# Patient Record
Sex: FEMALE | Race: WHITE | NOT HISPANIC OR LATINO | Employment: PART TIME | URBAN - METROPOLITAN AREA
[De-identification: names, ages, dates, MRNs, and addresses within clinical notes are randomized per-mention and may not be internally consistent; named-entity substitution may affect disease eponyms.]

---

## 2018-06-25 ENCOUNTER — RECORDS - HEALTHEAST (OUTPATIENT)
Dept: LAB | Facility: CLINIC | Age: 63
End: 2018-06-25

## 2018-06-25 LAB
CHOLEST SERPL-MCNC: 224 MG/DL
FASTING STATUS PATIENT QL REPORTED: ABNORMAL
FASTING STATUS PATIENT QL REPORTED: NORMAL
GLUCOSE BLD-MCNC: 86 MG/DL (ref 70–125)
HDLC SERPL-MCNC: 50 MG/DL
LDLC SERPL CALC-MCNC: 151 MG/DL
TRIGL SERPL-MCNC: 113 MG/DL
TSH SERPL DL<=0.005 MIU/L-ACNC: 0.29 UIU/ML (ref 0.3–5)

## 2018-06-26 LAB
HPV SOURCE: NORMAL
HUMAN PAPILLOMA VIRUS 16 DNA: NEGATIVE
HUMAN PAPILLOMA VIRUS 18 DNA: NEGATIVE
HUMAN PAPILLOMA VIRUS FINAL DIAGNOSIS: NORMAL
HUMAN PAPILLOMA VIRUS OTHER HR: NEGATIVE
SPECIMEN DESCRIPTION: NORMAL

## 2019-06-25 ENCOUNTER — RECORDS - HEALTHEAST (OUTPATIENT)
Dept: LAB | Facility: CLINIC | Age: 64
End: 2019-06-25

## 2019-06-25 LAB
CHOLEST SERPL-MCNC: 226 MG/DL
FASTING STATUS PATIENT QL REPORTED: NO
HDLC SERPL-MCNC: 45 MG/DL
LDLC SERPL CALC-MCNC: 138 MG/DL
TRIGL SERPL-MCNC: 214 MG/DL
TSH SERPL DL<=0.005 MIU/L-ACNC: 0.08 UIU/ML (ref 0.3–5)

## 2021-04-28 ENCOUNTER — RECORDS - HEALTHEAST (OUTPATIENT)
Dept: LAB | Facility: CLINIC | Age: 66
End: 2021-04-28

## 2021-04-28 LAB
CHOLEST SERPL-MCNC: 331 MG/DL
FASTING STATUS PATIENT QL REPORTED: ABNORMAL
HDLC SERPL-MCNC: 55 MG/DL
LDLC SERPL CALC-MCNC: 249 MG/DL
TRIGL SERPL-MCNC: 133 MG/DL
TSH SERPL DL<=0.005 MIU/L-ACNC: 54.16 UIU/ML (ref 0.3–5)

## 2021-05-26 ENCOUNTER — RECORDS - HEALTHEAST (OUTPATIENT)
Dept: ADMINISTRATIVE | Facility: CLINIC | Age: 66
End: 2021-05-26

## 2021-05-27 ENCOUNTER — RECORDS - HEALTHEAST (OUTPATIENT)
Dept: ADMINISTRATIVE | Facility: CLINIC | Age: 66
End: 2021-05-27

## 2021-07-12 PROCEDURE — 84443 ASSAY THYROID STIM HORMONE: CPT | Performed by: FAMILY MEDICINE

## 2021-07-12 PROCEDURE — 36415 COLL VENOUS BLD VENIPUNCTURE: CPT | Performed by: FAMILY MEDICINE

## 2021-07-12 ASSESSMENT — MIFFLIN-ST. JEOR: SCORE: 1328.69

## 2021-07-13 ENCOUNTER — LAB REQUISITION (OUTPATIENT)
Dept: LAB | Facility: CLINIC | Age: 66
End: 2021-07-13

## 2021-07-13 DIAGNOSIS — E03.9 HYPOTHYROIDISM, UNSPECIFIED: ICD-10-CM

## 2021-07-13 LAB — TSH SERPL DL<=0.005 MIU/L-ACNC: 11.2 UIU/ML (ref 0.3–5)

## 2021-08-26 ENCOUNTER — OFFICE VISIT (OUTPATIENT)
Dept: PHARMACY | Facility: PHYSICIAN GROUP | Age: 66
End: 2021-08-26
Payer: COMMERCIAL

## 2021-08-26 VITALS
DIASTOLIC BLOOD PRESSURE: 76 MMHG | WEIGHT: 168.8 LBS | HEIGHT: 62 IN | HEART RATE: 65 BPM | BODY MASS INDEX: 31.06 KG/M2 | SYSTOLIC BLOOD PRESSURE: 120 MMHG

## 2021-08-26 DIAGNOSIS — E66.811 CLASS 1 OBESITY WITH BODY MASS INDEX (BMI) OF 32.0 TO 32.9 IN ADULT, UNSPECIFIED OBESITY TYPE, UNSPECIFIED WHETHER SERIOUS COMORBIDITY PRESENT: ICD-10-CM

## 2021-08-26 DIAGNOSIS — E03.9 HYPOTHYROIDISM, UNSPECIFIED TYPE: Primary | ICD-10-CM

## 2021-08-26 DIAGNOSIS — E78.5 HYPERLIPIDEMIA LDL GOAL <100: ICD-10-CM

## 2021-08-26 PROCEDURE — 99605 MTMS BY PHARM NP 15 MIN: CPT | Performed by: PHARMACIST

## 2021-08-26 PROCEDURE — 99607 MTMS BY PHARM ADDL 15 MIN: CPT | Performed by: PHARMACIST

## 2021-08-26 RX ORDER — PHENTERMINE HYDROCHLORIDE 15 MG/1
15 CAPSULE ORAL EVERY MORNING
COMMUNITY

## 2021-08-26 RX ORDER — ROSUVASTATIN CALCIUM 20 MG/1
20 TABLET, COATED ORAL DAILY
COMMUNITY

## 2021-08-26 ASSESSMENT — MIFFLIN-ST. JEOR: SCORE: 1277.89

## 2021-08-26 NOTE — PROGRESS NOTES
Medication Therapy Management (MTM) Encounter    ASSESSMENT:                            Medication Adherence/Access: No issues identified    Hypothyroidism: Plan in place, dose increased about 1 month ago. Last TSH is above normal range.  WE can recheck TSH at my BP check with her next month instead of her having to come in twice in September.    Obesity: Phentermine has been helping with reducing appetite, and she has been losing a significant amount of weight already.  Some of this is likely also related to improvement in her thryroid levels with the dose increase of levothyroxine.   Phentermine has been well tolerated so far, with minimal effect on sleep.  Will follow up with her again in 1 month to recheck blood pressure and heart rate    Hyperlipidemia: Patient is not on high intensity statin which is indicated based on 2019 ACC/AHA guidelines for lipid management (LDL >190).  It is reasonable to recheck her lipid panel in 2 months to see how the weight loss and improved diet is affecting her cholesterol levels.  We can recalculate her ASCVD risk score, and consider starting the rosuvastatin at that time if indicated.        PLAN:                            1.  Continue phentermine 15 mg once daily in the morning.  Please let me know if the racing heart feeling increases, or you have more issues with sleeping at night. If you feel like the effect on appetite wears off, we can also consider increasing the dose.     2.  We will recheck your TSH at our next appointment on September 30th, instead of the apt that was scheduled for 9/8.    3.  We can recheck your cholesterol levels at your follow up appointment with Dr. Ya on 10/27.    Follow-up: Return in 5 weeks (on 9/30/2021) for MTM Follow Up, BP Recheck.      SUBJECTIVE/OBJECTIVE:                          Lisa Hackett is a 65 year old female coming in for an initial visit. She was referred to me from Dr. Diaz.      Reason for visit: Initial  medication review.  Patient was referred for help with weight loss and medication management.  We are meeting after she has been taking phentermine for a couple weeks.    Allergies/ADRs: Reviewed in chart  Past Medical History: Reviewed in chart  Tobacco: She reports that she has never smoked. She does not have any smokeless tobacco history on file.  Alcohol: 1-3 beverages / month    Medication Adherence/Access: no issues reported, doesn't forget to take levothyroxine.  She never started the rosuvastatin.    Hypothyroidism: Patient is taking levothyroxine 200 mcg daily. Patient is having the following symptoms: hypothyroidism -  fatigue and weight gain.   It's possible the high TSH may have been contributing to weight gain or making it more difficult to lose weight.  They increased her levothyroxine dose on 7/13 from 175 to 200 mcg daily.    TSH   Date Value Ref Range Status   07/12/2021 11.20 (H) 0.30 - 5.00 uIU/mL Final     Obesity: Current medication(s) include: Phentermine 15 mg once daily in the morning. Medication was started around 8/6/21. Patient is experiencing the follow side effects: sometimes feels heart racing feeling at bedtime, and having a little more difficulty falling asleep.  She describes these symptoms as mild, but has noticed them after starting the phentermine.   Denies jittery or side effects.  Sometimes at night feels racing heart feeling some difficulty sleeping at night.  She does have some slight lower appetite and thinks the phentermine is helping.  She had tried the ozempic (from a friend), and it worked great.  She didn't feel hungry on that, and didn't have any side effects.  We initially tried getting her Ozempic or Saxenda, but the insurance didn't cover either medication and first required trial on phentermine.  Diet/Eating Habits: Patient reports Proteins, veggies, some fruit.  Cut out breads and other carbs.  Has made significant improvements in diet in the last month..   "  Exercise: Patient reports walking 3 times daily, has always done this   Failed medications include: None.  Tried Ozempic from a friends prescription for a few weeks ans found it helpful.    Patient's personalized goal: Patient would like to reduce weight to 136 lb, normal BMI.  Wants to be healthy and reduce pain on ankles/joints.    Current weight today: 168 lbs 12.8 oz  Weight when weight loss medication was started: 180 lb   Weight trend: So far has lost 6.2% of total body weight, 11.2 lbs within 6 weeks    Wt Readings from Last 10 Encounters:   08/26/21 168 lb 12.8 oz (76.6 kg)   12/18/15 195 lb (88.5 kg)     BP Readings from Last 3 Encounters:   08/26/21 120/76     Patient's comorbidities associated with obesity include: Hyperlipidemia, Arthritis and Psychosocial stress resulting from obesity.    Hyperlipidemia: She was prescribed rosuvastatin 20 mg, but decided not to start it.  She wanted to focus on improving diet to see how much she could improve her cholesterol levels.  She would like to wait until the next cholesterol recheck and then would consider starting rosuvastatin.  The ASCVD Risk score ([place] MOOSE Jr., et al., 2013) failed to calculate for the following reasons:    The valid total cholesterol range is 130 to 320 mg/dL  Recent Labs   Lab Test 04/28/21  0836 06/25/19  1643   CHOL 331* 226*   HDL 55 45*   * 138*   TRIG 133 214*     Today's Vitals: /76   Pulse 65   Ht 5' 2.25\" (1.581 m)   Wt 168 lb 12.8 oz (76.6 kg)   BMI 30.63 kg/m    ----------------      I spent 45 minutes with this patient today. I offer these suggestions for consideration by Dr. Ya. A copy of the visit note was provided to the patient's primary care provider.    The patient was mailed a summary of these recommendations.     Kaci Henry PharmD  Medication Therapy Management Pharmacist  Pager: [phone]     Medication Therapy Recommendations  No medication therapy recommendations to display       "

## 2021-08-26 NOTE — PATIENT INSTRUCTIONS
Recommendations from today's MTM visit:                                                    MTM (medication therapy management) is a service provided by a clinical pharmacist designed to help you get the most of out of your medicines.   Today we reviewed what your medicines are for, how to know if they are working, that your medicines are safe and how to make your medicine regimen as easy as possible.      1.  Continue phentermine 15 mg once daily in the morning.  Please let me know if the racing heart feeling increases, or you have more issues with sleeping at night. If you feel like the effect on appetite wears off, we can also consider increasing the dose.     2.  We will recheck your TSH at our next appointment on September 30th, instead of the apt that was scheduled for 9/8.    3.  We can recheck your cholesterol levels at your follow up appointment with Dr. Ya on 10/27.    Follow-up: Return in 5 weeks (on 9/30/2021) for MTM Follow Up, BP Recheck.  - in-clinic appointment at 2:00 PM    10/27/2021, 2:00 PM - Follow up with Dr. Ya      It was great to speak with you today.  I value your experience and would be very thankful for your time with providing feedback on our clinic survey. You may receive a survey via email or text message in the next few days.     To schedule another MTM appointment, please call the clinic directly or you may call the MTM scheduling line at [phone] or toll-free at [phone].     My Clinical Pharmacist's contact information:                                                      Please feel free to contact me with any questions or concerns you have.      Kaci Henry, Adi  Medication Therapy Management Pharmacist  Pager: [phone]

## 2021-09-29 NOTE — PROGRESS NOTES
Medication Therapy Management (MTM) Encounter    ASSESSMENT:                            Medication Adherence/Access: No issues identified     Hypothyroidism: Plan in place, dose increased about 1 month ago (175 to 200 mcg). Last TSH is above normal range.  Reviewed labs from our apt last week and TSH now low, T4/T3 high.  Would be beneficial to reduce the levothyroxine dose 188 mcg.       Obesity: Phentermine has been helping with reducing appetite, and she has been losing a significant amount of weight already.  Some of this is likely also related to improvement in her thryroid levels with the dose increase of levothyroxine.   Phentermine has been well tolerated so far, with minimal effect on sleep.  Will follow up with her again in 1 month to recheck blood pressure and heart rate, she has PCP apt.  Could continue to check BP/HR every 3 months while on phentermine.       Hyperlipidemia: Patient is not on high intensity statin which is indicated based on 2019 ACC/AHA guidelines for lipid management (LDL >190).  It is reasonable to recheck her lipid panel in 2 months to see how the weight loss and improved diet is affecting her cholesterol levels.  We can recalculate her ASCVD risk score, and consider starting the rosuvastatin at that time if indicated.         PLAN:                            1.  Continue taking phentermine 15 mg once daily.  Continue to recheck BP and HR every 3 months while on phentermine.  2.  Recommend decreasing levothyroxine to 188 mcg daily.  ( 175 mcg plus 13 mcg tabs or a 100 plus 88)    Reviewed her TSH and it was <0.01,   Her Lipid panel showed LDL was still a bit elevated but improved a LOT with her weight loss and lifestyle changes.    Follow-up: with me as needed    SUBJECTIVE/OBJECTIVE:                          Lisa Hackett is a 65 year old female coming in for a follow-up visit. She was referred to me from Dr. Diaz.  Today's visit is a follow-up MTM visit from 8/26/21     Reason  for visit: Follow up blood pressure check sine starting phentermine for weight loss.  Also rechecking TSH today.    Allergies/ADRs: Reviewed in chart  Past Medical History: Reviewed in chart  Tobacco: She reports that she has never smoked. She does not have any smokeless tobacco history on file.  Alcohol: 1-3 beverages / month     Medication Adherence/Access: no issues reported, doesn't forget to take levothyroxine.  She never started the rosuvastatin.     Hypothyroidism: Patient is taking levothyroxine 200 mcg daily. Patient is having the following symptoms: hypothyroidism -  fatigue and weight gain.   It's possible the high TSH may have been contributing to weight gain or making it more difficult to lose weight.  They increased her levothyroxine dose on  from 175 to 200 mcg daily.     TSH   Date Value Ref Range Status   2021 <0.01 (L) 0.30 - 5.00 uIU/mL Final     Free T4   Date Value Ref Range Status   2021 1.96 (H) 0.70 - 1.80 ng/dL Final     Comment:     Performance of the Free T4 test has not been established with  specimens (<= 2 months of age).       Obesity: Current medication(s) include: Phentermine 15 mg once daily in the morning. Medication was started around 21. Patient is experiencing the follow side effects: sometimes feels heart racing feeling at bedtime, and having a little more difficulty falling asleep.  She describes these symptoms as mild, and haven't been as noticeable the last few weeks on it.  Denies jittery or side effects.  Sometimes at night feels racing heart feeling some difficulty sleeping at night.  She does have some slight lower appetite and thinks the phentermine is helping.  She had tried the ozempic (from a friend), and it worked great.  She didn't feel hungry on that, and didn't have any side effects.  We initially tried getting her Ozempic or Saxenda, but the insurance didn't cover either medication and first required trial on phentermine.  Diet/Eating  Habits: Patient reports Proteins, veggies, some fruit.  Cut out breads and other carbs.  Has made significant improvements in diet in the last month..    Exercise: Patient reports walking 3 times daily, has always done this   Failed medications include: None.  Tried Ozempic from a friends prescription for a few weeks ans found it helpful.    Patient's personalized goal: Patient would like to reduce weight to 136 lb, normal BMI.  Wants to be healthy and reduce pain on ankles/joints.    Current weight today: 162  Weight when weight loss medication was started: 180 lb   Weight trend: So far has lost 6.2% of total body weight, 11.2 lbs within 6 weeks  Patient's Goal Weight: 136 lb     Hyperlipidemia: Current therapy includes no current medications.  She was prescribed rosuvastatin 20 mg but didn't start it. She wanted to see how the weight loss, exercise and improved diet would affect her cholesterol levels.  We are rechecking her lipid panel today after 2-3 months of lifestyle changes.  The 10-year ASCVD risk score ([place] VIRK Jr., et al., 2013) is: 4.5%    Values used to calculate the score:      Age: 65 years      Sex: Female      Is Non- : No      Diabetic: No      Tobacco smoker: No      Systolic Blood Pressure: 120 mmHg      Is BP treated: No      HDL Cholesterol: 42 mg/dL      Total Cholesterol: 191 mg/dL  Recent Labs   Lab Test 09/30/21  1446 04/28/21  0836   CHOL 191 331*   HDL 42* 55    249*   TRIG 145 133         Today's Vitals: /68   Pulse 74   Wt 162 lb (73.5 kg)   BMI 29.39 kg/m    ----------------      I spent 30 minutes with this patient today. All changes were made via collaborative practice agreement with Muna Ya MD. A copy of the visit note was provided to the patient's primary care provider.    The patient declined a summary of these recommendations.     Tom BerryD  Medication Therapy Management Pharmacist  Pager: [phone]      Medication Therapy Recommendations  No medication therapy recommendations to display

## 2021-09-30 ENCOUNTER — LAB REQUISITION (OUTPATIENT)
Dept: LAB | Facility: CLINIC | Age: 66
End: 2021-09-30

## 2021-09-30 ENCOUNTER — OFFICE VISIT (OUTPATIENT)
Dept: PHARMACY | Facility: PHYSICIAN GROUP | Age: 66
End: 2021-09-30
Payer: COMMERCIAL

## 2021-09-30 DIAGNOSIS — E78.5 HYPERLIPIDEMIA LDL GOAL <100: ICD-10-CM

## 2021-09-30 DIAGNOSIS — E03.9 HYPOTHYROIDISM, UNSPECIFIED TYPE: Primary | ICD-10-CM

## 2021-09-30 DIAGNOSIS — E03.9 HYPOTHYROIDISM, UNSPECIFIED: ICD-10-CM

## 2021-09-30 DIAGNOSIS — E66.811 CLASS 1 OBESITY WITH BODY MASS INDEX (BMI) OF 32.0 TO 32.9 IN ADULT, UNSPECIFIED OBESITY TYPE, UNSPECIFIED WHETHER SERIOUS COMORBIDITY PRESENT: ICD-10-CM

## 2021-09-30 DIAGNOSIS — E78.5 HYPERLIPIDEMIA, UNSPECIFIED: ICD-10-CM

## 2021-09-30 LAB
CHOLEST SERPL-MCNC: 191 MG/DL
HDLC SERPL-MCNC: 42 MG/DL
LDLC SERPL CALC-MCNC: 120 MG/DL
TRIGL SERPL-MCNC: 145 MG/DL
TSH SERPL DL<=0.005 MIU/L-ACNC: <0.01 UIU/ML (ref 0.3–5)

## 2021-09-30 PROCEDURE — 99607 MTMS BY PHARM ADDL 15 MIN: CPT | Performed by: PHARMACIST

## 2021-09-30 PROCEDURE — 80061 LIPID PANEL: CPT | Performed by: FAMILY MEDICINE

## 2021-09-30 PROCEDURE — 84443 ASSAY THYROID STIM HORMONE: CPT | Performed by: FAMILY MEDICINE

## 2021-09-30 PROCEDURE — 99606 MTMS BY PHARM EST 15 MIN: CPT | Performed by: PHARMACIST

## 2021-10-05 VITALS
HEART RATE: 74 BPM | DIASTOLIC BLOOD PRESSURE: 68 MMHG | BODY MASS INDEX: 29.39 KG/M2 | SYSTOLIC BLOOD PRESSURE: 120 MMHG | WEIGHT: 162 LBS

## 2021-11-03 ENCOUNTER — LAB REQUISITION (OUTPATIENT)
Dept: LAB | Facility: CLINIC | Age: 66
End: 2021-11-03

## 2021-11-03 DIAGNOSIS — M79.606 PAIN IN LEG, UNSPECIFIED: ICD-10-CM

## 2021-11-03 LAB
ANION GAP SERPL CALCULATED.3IONS-SCNC: 6 MMOL/L (ref 5–18)
BUN SERPL-MCNC: 14 MG/DL (ref 8–22)
CALCIUM SERPL-MCNC: 9.9 MG/DL (ref 8.5–10.5)
CHLORIDE BLD-SCNC: 106 MMOL/L (ref 98–107)
CO2 SERPL-SCNC: 29 MMOL/L (ref 22–31)
CREAT SERPL-MCNC: 0.74 MG/DL (ref 0.6–1.1)
GFR SERPL CREATININE-BSD FRML MDRD: 86 ML/MIN/1.73M2
GLUCOSE BLD-MCNC: 101 MG/DL (ref 70–125)
POTASSIUM BLD-SCNC: 4.7 MMOL/L (ref 3.5–5)
SODIUM SERPL-SCNC: 141 MMOL/L (ref 136–145)

## 2021-11-03 PROCEDURE — 80048 BASIC METABOLIC PNL TOTAL CA: CPT | Performed by: FAMILY MEDICINE

## 2021-11-03 PROCEDURE — 82306 VITAMIN D 25 HYDROXY: CPT | Performed by: FAMILY MEDICINE

## 2021-11-04 LAB — DEPRECATED CALCIDIOL+CALCIFEROL SERPL-MC: 24 UG/L (ref 30–80)

## 2022-11-29 ENCOUNTER — LAB REQUISITION (OUTPATIENT)
Dept: LAB | Facility: CLINIC | Age: 67
End: 2022-11-29
Payer: COMMERCIAL

## 2022-11-29 DIAGNOSIS — E78.5 HYPERLIPIDEMIA, UNSPECIFIED: ICD-10-CM

## 2022-11-29 DIAGNOSIS — E03.9 HYPOTHYROIDISM, UNSPECIFIED: ICD-10-CM

## 2022-11-29 LAB
CHOLEST SERPL-MCNC: 278 MG/DL
HDLC SERPL-MCNC: 66 MG/DL
LDLC SERPL CALC-MCNC: 186 MG/DL
NONHDLC SERPL-MCNC: 212 MG/DL
TRIGL SERPL-MCNC: 132 MG/DL

## 2022-11-29 PROCEDURE — 80061 LIPID PANEL: CPT | Mod: ORL | Performed by: PHYSICIAN ASSISTANT

## 2022-11-29 PROCEDURE — 84443 ASSAY THYROID STIM HORMONE: CPT | Mod: ORL | Performed by: PHYSICIAN ASSISTANT

## 2022-11-30 LAB — TSH SERPL DL<=0.005 MIU/L-ACNC: 0.03 UIU/ML (ref 0.3–4.2)

## 2023-07-17 ENCOUNTER — LAB REQUISITION (OUTPATIENT)
Dept: LAB | Facility: CLINIC | Age: 68
End: 2023-07-17
Payer: COMMERCIAL

## 2023-07-17 DIAGNOSIS — E03.9 HYPOTHYROIDISM, UNSPECIFIED: ICD-10-CM

## 2023-07-17 PROCEDURE — 84443 ASSAY THYROID STIM HORMONE: CPT | Mod: ORL | Performed by: FAMILY MEDICINE

## 2023-07-18 LAB — TSH SERPL DL<=0.005 MIU/L-ACNC: 11.9 UIU/ML (ref 0.3–4.2)

## 2024-01-17 ENCOUNTER — APPOINTMENT (OUTPATIENT)
Dept: GENERAL RADIOLOGY | Facility: CLINIC | Age: 69
End: 2024-01-17
Attending: EMERGENCY MEDICINE
Payer: COMMERCIAL

## 2024-01-17 ENCOUNTER — HOSPITAL ENCOUNTER (EMERGENCY)
Facility: CLINIC | Age: 69
Discharge: HOME | End: 2024-01-17
Attending: EMERGENCY MEDICINE | Admitting: EMERGENCY MEDICINE
Payer: COMMERCIAL

## 2024-01-17 VITALS
HEART RATE: 66 BPM | DIASTOLIC BLOOD PRESSURE: 73 MMHG | OXYGEN SATURATION: 96 % | RESPIRATION RATE: 18 BRPM | TEMPERATURE: 97.8 F | SYSTOLIC BLOOD PRESSURE: 148 MMHG

## 2024-01-17 DIAGNOSIS — S92.351A CLOSED DISPLACED FRACTURE OF FIFTH METATARSAL BONE OF RIGHT FOOT, INITIAL ENCOUNTER: ICD-10-CM

## 2024-01-17 PROCEDURE — 28470 CLTX METATARSAL FX WO MNP EA: CPT | Mod: 54 | Performed by: EMERGENCY MEDICINE

## 2024-01-17 PROCEDURE — G0463 HOSPITAL OUTPT CLINIC VISIT: HCPCS | Mod: 25 | Performed by: EMERGENCY MEDICINE

## 2024-01-17 PROCEDURE — 99203 OFFICE O/P NEW LOW 30 MIN: CPT | Mod: 57 | Performed by: EMERGENCY MEDICINE

## 2024-01-17 PROCEDURE — 73630 X-RAY EXAM OF FOOT: CPT | Mod: RT

## 2024-01-17 PROCEDURE — 28470 CLTX METATARSAL FX WO MNP EA: CPT | Mod: RT | Performed by: EMERGENCY MEDICINE

## 2024-01-17 ASSESSMENT — ACTIVITIES OF DAILY LIVING (ADL): ADLS_ACUITY_SCORE: 35

## 2024-01-17 NOTE — ED PROVIDER NOTES
History     Chief Complaint   Patient presents with    Foot Injury     Yesterday was chasing cat and somehow injured right foot. Is bruised and swollen. Iced it and elevation and took tylenol.     HPI  Lisa Hackett is a 68 year old female who presents for right foot pain, started yesterday when she was chasing her cat.  She is not sure what happened but had pain over the lateral aspect of the midfoot, hurts to walk.  She has tried icing it and using acetaminophen and using elevation with minimal improvement.  She had a lot of trouble and could not get her shoes on today so decided to come in and get checked.  No other injuries.    Allergies:  No Known Allergies    Problem List:    There are no problems to display for this patient.       Past Medical History:    No past medical history on file.    Past Surgical History:    No past surgical history on file.    Family History:    No family history on file.    Social History:  Marital Status:   [2]  Social History     Tobacco Use    Smoking status: Never        Medications:    levothyroxine (SYNTHROID/LEVOTHROID) 200 MCG tablet  phentermine (ADIPEX-P) 15 MG capsule  rosuvastatin (CRESTOR) 20 MG tablet          Review of Systems    Physical Exam   BP: (!) 148/73  Pulse: 66  Temp: 97.8  F (36.6  C)  Resp: 18  SpO2: 96 %      Physical Exam  Constitutional:       General: She is not in acute distress.     Appearance: Normal appearance. She is well-developed.   HENT:      Head: Normocephalic and atraumatic.   Eyes:      General: No scleral icterus.     Conjunctiva/sclera: Conjunctivae normal.   Cardiovascular:      Rate and Rhythm: Normal rate.   Pulmonary:      Effort: Pulmonary effort is normal. No respiratory distress.   Abdominal:      General: Abdomen is flat.   Musculoskeletal:      Cervical back: Normal range of motion and neck supple.      Comments: Right foot: Ecchymosis and tenderness over the fifth metatarsal.  No tenderness over the ankle.  Normal  range of motion of the ankle.   Skin:     General: Skin is warm and dry.      Findings: No rash.   Neurological:      Mental Status: She is alert and oriented to person, place, and time.         ED Course                 Procedures              Critical Care time:  none               No results found for this or any previous visit (from the past 24 hour(s)).    Medications - No data to display    Assessments & Plan (with Medical Decision Making)   68-year-old female presents with right foot pain.  X-ray of the foot obtained, images interpreted independently as well as radiology read reviewed, positive for fracture through the fifth metatarsal.  The patient is placed in a walking boot and given crutches.  She is safe to discharge with instructions to use elevation, ice, acetaminophen, ibuprofen, and referrals made for orthopedic surgery follow-up.  The patient is in agreement with this plan.    I have reviewed the nursing notes.    I have reviewed the findings, diagnosis, plan and need for follow up with the patient.         New Prescriptions    No medications on file       Final diagnoses:   Closed displaced fracture of fifth metatarsal bone of right foot, initial encounter       1/17/2024   [facility] EMERGENCY DEPT       Jeff Boyer MD  01/17/24 3601

## 2024-01-24 ENCOUNTER — LAB REQUISITION (OUTPATIENT)
Dept: LAB | Facility: CLINIC | Age: 69
End: 2024-01-24
Payer: COMMERCIAL

## 2024-01-24 DIAGNOSIS — Z13.220 ENCOUNTER FOR SCREENING FOR LIPOID DISORDERS: ICD-10-CM

## 2024-01-24 DIAGNOSIS — Z13.1 ENCOUNTER FOR SCREENING FOR DIABETES MELLITUS: ICD-10-CM

## 2024-01-24 DIAGNOSIS — E03.9 HYPOTHYROIDISM, UNSPECIFIED: ICD-10-CM

## 2024-01-24 PROCEDURE — 80053 COMPREHEN METABOLIC PANEL: CPT | Mod: ORL | Performed by: FAMILY MEDICINE

## 2024-01-24 PROCEDURE — 80061 LIPID PANEL: CPT | Mod: ORL | Performed by: FAMILY MEDICINE

## 2024-01-24 PROCEDURE — 84443 ASSAY THYROID STIM HORMONE: CPT | Mod: ORL | Performed by: FAMILY MEDICINE

## 2024-01-26 LAB
ALBUMIN SERPL BCG-MCNC: 4 G/DL (ref 3.5–5.2)
ALP SERPL-CCNC: 59 U/L (ref 40–150)
ALT SERPL W P-5'-P-CCNC: 18 U/L (ref 0–50)
ANION GAP SERPL CALCULATED.3IONS-SCNC: 8 MMOL/L (ref 7–15)
AST SERPL W P-5'-P-CCNC: 17 U/L (ref 0–45)
BILIRUB SERPL-MCNC: 0.3 MG/DL
BUN SERPL-MCNC: 13.9 MG/DL (ref 8–23)
CALCIUM SERPL-MCNC: 9.4 MG/DL (ref 8.8–10.2)
CHLORIDE SERPL-SCNC: 104 MMOL/L (ref 98–107)
CHOLEST SERPL-MCNC: 278 MG/DL
CREAT SERPL-MCNC: 0.86 MG/DL (ref 0.51–0.95)
DEPRECATED HCO3 PLAS-SCNC: 27 MMOL/L (ref 22–29)
EGFRCR SERPLBLD CKD-EPI 2021: 74 ML/MIN/1.73M2
FASTING STATUS PATIENT QL REPORTED: ABNORMAL
GLUCOSE SERPL-MCNC: 86 MG/DL (ref 70–99)
HDLC SERPL-MCNC: 59 MG/DL
LDLC SERPL CALC-MCNC: 184 MG/DL
NONHDLC SERPL-MCNC: 219 MG/DL
POTASSIUM SERPL-SCNC: 4.3 MMOL/L (ref 3.4–5.3)
PROT SERPL-MCNC: 6.4 G/DL (ref 6.4–8.3)
SODIUM SERPL-SCNC: 139 MMOL/L (ref 135–145)
TRIGL SERPL-MCNC: 176 MG/DL
TSH SERPL DL<=0.005 MIU/L-ACNC: 8.13 UIU/ML (ref 0.3–4.2)

## 2024-06-06 ENCOUNTER — LAB REQUISITION (OUTPATIENT)
Dept: LAB | Facility: CLINIC | Age: 69
End: 2024-06-06
Payer: COMMERCIAL

## 2024-06-06 DIAGNOSIS — E03.9 HYPOTHYROIDISM, UNSPECIFIED: ICD-10-CM

## 2024-06-06 PROCEDURE — 84443 ASSAY THYROID STIM HORMONE: CPT | Mod: ORL | Performed by: PHYSICIAN ASSISTANT

## 2024-06-06 PROCEDURE — 84439 ASSAY OF FREE THYROXINE: CPT | Mod: ORL | Performed by: PHYSICIAN ASSISTANT

## 2024-06-07 LAB
T4 FREE SERPL-MCNC: 2.54 NG/DL (ref 0.9–1.7)
TSH SERPL DL<=0.005 MIU/L-ACNC: 0.01 UIU/ML (ref 0.3–4.2)

## 2025-03-13 ENCOUNTER — LAB REQUISITION (OUTPATIENT)
Dept: LAB | Facility: CLINIC | Age: 70
End: 2025-03-13
Payer: COMMERCIAL

## 2025-03-13 DIAGNOSIS — Z13.1 ENCOUNTER FOR SCREENING FOR DIABETES MELLITUS: ICD-10-CM

## 2025-03-13 DIAGNOSIS — E03.9 HYPOTHYROIDISM, UNSPECIFIED: ICD-10-CM

## 2025-03-13 PROCEDURE — 84439 ASSAY OF FREE THYROXINE: CPT | Mod: ORL | Performed by: PHYSICIAN ASSISTANT

## 2025-03-13 PROCEDURE — 84443 ASSAY THYROID STIM HORMONE: CPT | Mod: ORL | Performed by: PHYSICIAN ASSISTANT

## 2025-03-13 PROCEDURE — 80048 BASIC METABOLIC PNL TOTAL CA: CPT | Mod: ORL | Performed by: PHYSICIAN ASSISTANT

## 2025-03-15 LAB
ANION GAP SERPL CALCULATED.3IONS-SCNC: 11 MMOL/L (ref 7–15)
BUN SERPL-MCNC: 12.6 MG/DL (ref 8–23)
CALCIUM SERPL-MCNC: 9.7 MG/DL (ref 8.8–10.4)
CHLORIDE SERPL-SCNC: 104 MMOL/L (ref 98–107)
CREAT SERPL-MCNC: 0.7 MG/DL (ref 0.51–0.95)
EGFRCR SERPLBLD CKD-EPI 2021: >90 ML/MIN/1.73M2
GLUCOSE SERPL-MCNC: 89 MG/DL (ref 70–99)
HCO3 SERPL-SCNC: 26 MMOL/L (ref 22–29)
POTASSIUM SERPL-SCNC: 4.8 MMOL/L (ref 3.4–5.3)
SODIUM SERPL-SCNC: 141 MMOL/L (ref 135–145)
T4 FREE SERPL-MCNC: 1.92 NG/DL (ref 0.9–1.7)
TSH SERPL DL<=0.005 MIU/L-ACNC: 0.01 UIU/ML (ref 0.3–4.2)

## 2025-05-15 ENCOUNTER — LAB REQUISITION (OUTPATIENT)
Dept: LAB | Facility: CLINIC | Age: 70
End: 2025-05-15
Payer: COMMERCIAL

## 2025-05-15 DIAGNOSIS — E03.9 HYPOTHYROIDISM, UNSPECIFIED: ICD-10-CM

## 2025-05-15 PROCEDURE — 84439 ASSAY OF FREE THYROXINE: CPT | Mod: ORL | Performed by: PHYSICIAN ASSISTANT

## 2025-05-15 PROCEDURE — 84443 ASSAY THYROID STIM HORMONE: CPT | Mod: ORL | Performed by: PHYSICIAN ASSISTANT

## 2025-05-16 LAB
T4 FREE SERPL-MCNC: 2.59 NG/DL (ref 0.9–1.7)
TSH SERPL DL<=0.005 MIU/L-ACNC: 0.01 UIU/ML (ref 0.3–4.2)